# Patient Record
(demographics unavailable — no encounter records)

---

## 2024-11-26 NOTE — HISTORY OF PRESENT ILLNESS
[FreeTextEntry1] : pt states she had spotting yesterday; mild cramping; pt states her blood type positive; no other symptoms

## 2024-11-26 NOTE — PHYSICAL EXAM
[Chaperone Declined] : Patient declined chaperone [Appropriately responsive] : appropriately responsive [Soft] : soft [Labia Majora] : normal [Labia Minora] : normal [Normal] : normal [Uterine Adnexae] : normal [FreeTextEntry4] : no blood in vault [FreeTextEntry5] : cvx closed; no polyps; no lesions

## 2025-06-03 NOTE — HISTORY OF PRESENT ILLNESS
[Postpartum Follow Up] : postpartum follow up [Delivery Date: ___] : on [unfilled] [Male] : Delivery History: baby boy [Wt. ___] : weighing [unfilled] [Breastfeeding] : currently nursing [None] : No associated symptoms are reported [Primary C/S] : delivered by  section [Clean/Dry/Intact] : clean, dry and intact [Mild] : mild vaginal bleeding [Not Done] : Examination of breasts not done [Doing Well] : is doing well [No Sign of Infection] : is showing no signs of infection [Excellent Pain Control] : has excellent pain control [No [place]] : to avoid sexual intercourse [No Tampons/Suppositories] : against using tampons or vaginal suppositories [Limited ADLs] : to participate in activities of daily living with limitations [No Work] : not to work [No Housework] : not to do housework [No Sports] : not to participate in sports [BF with Difficulty] : nursing without difficulty [Erythema] : not erythematous [Swelling] : not swollen [Dehiscence] : not dehisced [Sutures Removed] : sutures were not removed [Staples Removed] : staples were not removed [FreeTextEntry9] : LGA

## 2025-06-03 NOTE — HISTORY OF PRESENT ILLNESS
[Postpartum Follow Up] : postpartum follow up [Delivery Date: ___] : on [unfilled] [Male] : Delivery History: baby boy [Wt. ___] : weighing [unfilled] [Breastfeeding] : currently nursing [None] : No associated symptoms are reported [Primary C/S] : delivered by  section [Clean/Dry/Intact] : clean, dry and intact [Mild] : mild vaginal bleeding [Not Done] : Examination of breasts not done [Doing Well] : is doing well [No Sign of Infection] : is showing no signs of infection [Excellent Pain Control] : has excellent pain control [No [place]] : to avoid sexual intercourse [No Tampons/Suppositories] : against using tampons or vaginal suppositories [Limited ADLs] : to participate in activities of daily living with limitations [No Work] : not to work [No Housework] : not to do housework [No Sports] : not to participate in sports [BF with Difficulty] : nursing without difficulty [Erythema] : not erythematous [Swelling] : not swollen [Dehiscence] : not dehisced [Sutures Removed] : sutures were not removed [Staples Removed] : staples were not removed [FreeTextEntry9] : LGA

## 2025-07-01 NOTE — HISTORY OF PRESENT ILLNESS
[Postpartum Follow Up] : postpartum follow up [Delivery Date: ___] : on [unfilled] [Primary C/S] : delivered by  section [Male] : Delivery History: baby boy [Wt. ___] : weighing [unfilled] [Breastfeeding] : currently nursing [Clean/Dry/Intact] : clean, dry and intact [Complications:___] : no complications [BF with Difficulty] : nursing without difficulty [Resumed Menses] : has not resumed her menses [Resumed [place]] : has not resumed intercourse [Erythema] : not erythematous [Swelling] : not swollen [Dehiscence] : not dehisced [Healed] : healed [Back to Normal] : is back to normal in size [Normal] : the vagina was normal [Cervix Sample Taken] : cervical sample not taken for a Pap smear [Not Done] : Examination of breasts not done [Doing Well] : is doing well [No Sign of Infection] : is showing no signs of infection [Excellent Pain Control] : has excellent pain control [Sutures Removed] : sutures were not removed [Staples Removed] : staples were not removed [None] : None [de-identified] : contraception discussed - will use condoms.  Return for annual exam.